# Patient Record
Sex: MALE | Race: WHITE | NOT HISPANIC OR LATINO | Employment: OTHER | URBAN - METROPOLITAN AREA
[De-identification: names, ages, dates, MRNs, and addresses within clinical notes are randomized per-mention and may not be internally consistent; named-entity substitution may affect disease eponyms.]

---

## 2020-10-01 ENCOUNTER — TELEPHONE (OUTPATIENT)
Dept: CARDIOLOGY | Facility: CLINIC | Age: 80
End: 2020-10-01

## 2020-10-01 NOTE — TELEPHONE ENCOUNTER
----- Message from Dot Cope sent at 10/1/2020  8:37 AM CDT -----  PATIENT NEEDS REFILL ON METROPROLOL ER 25MG  1/2 TAB QD  [facility] [phone]

## 2020-10-23 ENCOUNTER — OFFICE VISIT (OUTPATIENT)
Dept: CARDIOLOGY | Facility: CLINIC | Age: 80
End: 2020-10-23
Payer: MEDICARE

## 2020-10-23 VITALS
BODY MASS INDEX: 19.64 KG/M2 | SYSTOLIC BLOOD PRESSURE: 110 MMHG | WEIGHT: 153 LBS | RESPIRATION RATE: 16 BRPM | DIASTOLIC BLOOD PRESSURE: 60 MMHG | HEART RATE: 78 BPM | HEIGHT: 74 IN | OXYGEN SATURATION: 98 %

## 2020-10-23 DIAGNOSIS — R53.83 FATIGUE, UNSPECIFIED TYPE: ICD-10-CM

## 2020-10-23 DIAGNOSIS — M25.512 LEFT SHOULDER PAIN, UNSPECIFIED CHRONICITY: ICD-10-CM

## 2020-10-23 DIAGNOSIS — I95.1 ORTHOSTATIC HYPOTENSION: ICD-10-CM

## 2020-10-23 DIAGNOSIS — Z95.0 PRESENCE OF PERMANENT CARDIAC PACEMAKER: ICD-10-CM

## 2020-10-23 DIAGNOSIS — Z98.890 HISTORY OF MITRAL VALVE REPAIR: Primary | ICD-10-CM

## 2020-10-23 DIAGNOSIS — Z95.1 HX OF CABG: ICD-10-CM

## 2020-10-23 DIAGNOSIS — I51.9 ASYMPTOMATIC LV DYSFUNCTION: ICD-10-CM

## 2020-10-23 DIAGNOSIS — I25.10 CAD IN NATIVE ARTERY: ICD-10-CM

## 2020-10-23 PROCEDURE — 99214 OFFICE O/P EST MOD 30 MIN: CPT | Mod: S$GLB,,, | Performed by: INTERNAL MEDICINE

## 2020-10-23 PROCEDURE — 99214 PR OFFICE/OUTPT VISIT, EST, LEVL IV, 30-39 MIN: ICD-10-PCS | Mod: S$GLB,,, | Performed by: INTERNAL MEDICINE

## 2020-10-23 RX ORDER — METOPROLOL SUCCINATE 25 MG/1
25 TABLET, EXTENDED RELEASE ORAL DAILY
Status: ON HOLD | COMMUNITY
End: 2021-10-07

## 2020-10-23 RX ORDER — CLOPIDOGREL BISULFATE 75 MG/1
75 TABLET ORAL DAILY
COMMUNITY
End: 2020-11-17

## 2020-10-23 RX ORDER — CLONAZEPAM 2 MG/1
2 TABLET ORAL NIGHTLY
COMMUNITY
Start: 2020-10-14

## 2020-10-23 NOTE — PROGRESS NOTES
Subjective:     Patient ID:  Matty Barrera is a 80 y.o. male who presents with:    Chief Complaint:   Chief Complaint   Patient presents with    Fatigue       HPI:  Mr. Matty Barrera is here for follow-up.  Patient is doing well he complains of having his left shoulder hurting and is painful on rotation.  And he was scheduled to see an orthopedic doctor sometime ago but unfortunately his primary physician retired and he had not seen any orthopedic doctor since that.  Patient wants to hold off till the hunting season is over.  Patient's breathing is good denies any shortness of breath or difficulty in breathing.  He occasionally has aches and pains around across the chest wall spontaneously relieved no exertional angina.  Denies any dizziness or lightheadedness except when he bends over.  Denies any palpitations.  Denies any loss of consciousness per denies any falls or head injury  and denies any bleeding or blood in the stool or urine.      Review of patient's allergies indicates:   Allergen Reactions    Iodine     Iohexol     Statins-hmg-coa reductase inhibitors        History reviewed. No pertinent past medical history.  History reviewed. No pertinent surgical history.  Social History     Tobacco Use    Smoking status: Former Smoker    Smokeless tobacco: Never Used   Substance Use Topics    Alcohol use: Never     Frequency: Never    Drug use: Never          REVIEW OF SYSTEMS  CONSTITUTIONAL: Negative for chills,and fever.  Complains of fatigue.  EYES: No double vision, No blurred vision  NEURO: No headaches, No dizziness  RESPIRATORY: Negative for cough, shortness of breath and wheezing.    CARDIOVASCULAR: Negative for chest pain. Negative for palpitations and leg swelling.   GI: Negative for abdominal pain, No melena, diarrhea, nausea and vomiting.   : Negative for dysuria and frequency, Negative for hematuria  SKIN: Negative for bruising, Negative for edema or discoloration noted.   ENDOCRINE: Negative for  polyphagia, Negative for heat intolerance, Negative for cold intolerance  PSYCHIATRIC: Negative for depression, Negative for anxiety, Negative for memory loss  MUSCULOSKELETAL:  Left shoulder pain.  Arthritis.       Objective:        Vitals:    10/23/20 0928   BP: 110/60   Pulse: 78   Resp: 16       No results found for: WBC, HGB, HCT, PLT, CHOL, TRIG, HDL, LDLDIRECT, ALT, AST, NA, K, CL, CREATININE, BUN, CO2, TSH, PSA, INR, GLUF, HGBA1C, MICROALBUR     ECHOCARDIOGRAM RESULTS  No results found for this or any previous visit.      CURRENT/PREVIOUS VISIT EKG  No results found for this or any previous visit.  No procedure found.   No results found for this or any previous visit.      PHYSICAL EXAM  GENERAL: well built, well nourished, well-developed in no apparent distress alert and oriented.   HEENT: Normocephalic. Pupils normal and conjunctivae normal.  Mucous membranes normal, no cyanosis or icterus, trachea central,no pallor or icterus is noted..   NECK: No JVD. No bruit..   THYROID: Thyroid not enlarged. No nodules present..   CARDIAC: Regular rate and rhythm. S1 is normal.S2 is normal.No gallops, clicks or murmurs noted at this time.  CHEST ANATOMY: normal.   LUNGS: Clear to auscultation. No wheezing or rhonchi..   ABDOMEN: Soft no masses or organomegaly.  No abdomen pulsations or bruits.  Normal bowel sounds. No pulsations and no masses felt, No guarding or rebound.   URINARY: No yarbrough catheter   EXTREMITIES: No cyanosis, clubbing or edema noted at this time., no calf tenderness bilaterally.  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  PERIPHERAL VASCULAR SYSTEM: Good palpable distal pulses.   CENTRAL NERVOUS SYSTEM: No focal motor or sensory deficits noted.   SKIN: Skin without lesions, moist, well perfused.   MUSCLE STRENGTH & TONE: No noteable weakness, atrophy or abnormal movement.       Medication List with Changes/Refills   Current Medications    CLONAZEPAM (KLONOPIN) 2 MG TAB    Take 2 mg by mouth nightly.    CLOPIDOGREL  (PLAVIX) 75 MG TABLET    Take 75 mg by mouth once daily.    METOPROLOL SUCCINATE (TOPROL-XL) 25 MG 24 HR TABLET    Take 25 mg by mouth once daily.             Assessment:   1.  Severe muscle ache along the upper portion of the trapezius  2.  Upper shoulder and arm pain.  3.  Status post permanent pacemaker implantation  4.  Atherosclerotic coronary artery disease  5.  Status post mitral valve repair  6.  Status post coronary artery bypass grafting  7.  Abnormal neurocardiogenic Axis  8.  LV dysfunction  9.  Intermittent dizziness on bending over  10.  Fatigue and tiredness      Plan:   1.  Significant shoulder pain she painful rotation of the shoulder.  Will schedule a consult to see an orthopedic doctor  2.  Occasional dizziness and lightheadedness on bending over otherwise patient has been doing well and his blood pressure has been stable it is 110/60.  3.  His pacemaker is functioning well he is currently atrial paced rhythm.  And he still has 1.5 years remaining before the battery needs to be changed.  6.  His EKG has not changed from the previous EKG  7.  His blood pressure is very well controlled.Its 110/60 however when he stands up his blood pressure drops at times and he feels fatigued and tired.  Patient to continue to wear compression stockings and drink Gatorade.  And keep himself well hydrated.  8.  Continue current management and I will see him back in the office in 4-6 months.  9.  Will schedule him for a 2D echocardiogram in 3 months time and I will see him back in the office in 4-5 months time.    Problem List Items Addressed This Visit     None      Visit Diagnoses     History of mitral valve repair    -  Primary    Relevant Orders    Echo Color Flow Doppler? Yes    Left shoulder pain, unspecified chronicity        Presence of permanent cardiac pacemaker        Relevant Orders    Echo Color Flow Doppler? Yes    CAD in native artery        Relevant Orders    Echo Color Flow Doppler? Yes     Orthostatic hypotension        Hx of CABG        Relevant Orders    Echo Color Flow Doppler? Yes    Asymptomatic LV dysfunction        Relevant Orders    Echo Color Flow Doppler? Yes          Follow up in about 5 months (around 3/23/2021).

## 2020-12-08 DIAGNOSIS — I49.5 SICK SINUS SYNDROME: Primary | ICD-10-CM

## 2021-01-14 ENCOUNTER — HOSPITAL ENCOUNTER (OUTPATIENT)
Dept: CARDIOLOGY | Facility: CLINIC | Age: 81
Discharge: HOME | End: 2021-01-14
Attending: INTERNAL MEDICINE
Payer: MEDICARE

## 2021-01-14 DIAGNOSIS — I49.5 SICK SINUS SYNDROME: ICD-10-CM

## 2021-05-13 ENCOUNTER — HOSPITAL ENCOUNTER (OUTPATIENT)
Dept: CARDIOLOGY | Facility: CLINIC | Age: 81
Discharge: HOME | End: 2021-05-13
Payer: MEDICARE

## 2021-05-14 ENCOUNTER — OFFICE VISIT (OUTPATIENT)
Dept: CARDIOLOGY | Facility: CLINIC | Age: 81
End: 2021-05-14
Payer: MEDICARE

## 2021-05-14 VITALS
HEIGHT: 74 IN | RESPIRATION RATE: 16 BRPM | OXYGEN SATURATION: 98 % | BODY MASS INDEX: 20.53 KG/M2 | SYSTOLIC BLOOD PRESSURE: 126 MMHG | DIASTOLIC BLOOD PRESSURE: 82 MMHG | HEART RATE: 76 BPM | WEIGHT: 160 LBS

## 2021-05-14 DIAGNOSIS — M25.512 LEFT SHOULDER PAIN, UNSPECIFIED CHRONICITY: ICD-10-CM

## 2021-05-14 DIAGNOSIS — R55 NEUROCARDIOGENIC SYNCOPE: ICD-10-CM

## 2021-05-14 DIAGNOSIS — Z95.1 HX OF CABG: ICD-10-CM

## 2021-05-14 DIAGNOSIS — Z95.0 PRESENCE OF PERMANENT CARDIAC PACEMAKER: ICD-10-CM

## 2021-05-14 DIAGNOSIS — I25.10 CAD IN NATIVE ARTERY: ICD-10-CM

## 2021-05-14 DIAGNOSIS — I95.1 ORTHOSTATIC HYPOTENSION: ICD-10-CM

## 2021-05-14 DIAGNOSIS — K25.7 CHRONIC GASTRIC ULCER, UNSPECIFIED WHETHER GASTRIC ULCER HEMORRHAGE OR PERFORATION PRESENT: ICD-10-CM

## 2021-05-14 DIAGNOSIS — I51.9 ASYMPTOMATIC LV DYSFUNCTION: ICD-10-CM

## 2021-05-14 DIAGNOSIS — R94.31 NONSPECIFIC ABNORMAL ELECTROCARDIOGRAM (ECG): Primary | ICD-10-CM

## 2021-05-14 DIAGNOSIS — I49.5 SICK SINUS SYNDROME: ICD-10-CM

## 2021-05-14 DIAGNOSIS — Z98.890 HISTORY OF MITRAL VALVE REPAIR: ICD-10-CM

## 2021-05-14 DIAGNOSIS — C64.2 RENAL CELL CARCINOMA OF LEFT KIDNEY: ICD-10-CM

## 2021-05-14 PROCEDURE — 99214 OFFICE O/P EST MOD 30 MIN: CPT | Mod: S$GLB,,, | Performed by: INTERNAL MEDICINE

## 2021-05-14 PROCEDURE — 99214 PR OFFICE/OUTPT VISIT, EST, LEVL IV, 30-39 MIN: ICD-10-PCS | Mod: S$GLB,,, | Performed by: INTERNAL MEDICINE

## 2021-05-14 PROCEDURE — 93000 ELECTROCARDIOGRAM COMPLETE: CPT | Mod: S$GLB,,, | Performed by: INTERNAL MEDICINE

## 2021-05-14 PROCEDURE — 93000 EKG 12-LEAD: ICD-10-PCS | Mod: S$GLB,,, | Performed by: INTERNAL MEDICINE

## 2021-05-14 RX ORDER — CYANOCOBALAMIN 1000 UG/ML
INJECTION, SOLUTION INTRAMUSCULAR; SUBCUTANEOUS
COMMUNITY
Start: 2021-03-24 | End: 2021-10-11

## 2021-05-14 RX ORDER — MECLIZINE HYDROCHLORIDE 25 MG/1
TABLET ORAL
COMMUNITY
Start: 2021-03-06 | End: 2021-10-05 | Stop reason: CLARIF

## 2021-05-14 RX ORDER — PREDNISONE 10 MG/1
TABLET ORAL
COMMUNITY
Start: 2021-03-08 | End: 2021-10-05 | Stop reason: CLARIF

## 2021-05-17 ENCOUNTER — TELEPHONE (OUTPATIENT)
Dept: CARDIOLOGY | Facility: CLINIC | Age: 81
End: 2021-05-17

## 2021-07-02 ENCOUNTER — TELEPHONE (OUTPATIENT)
Dept: CARDIOLOGY | Facility: CLINIC | Age: 81
End: 2021-07-02

## 2021-07-08 ENCOUNTER — HOSPITAL ENCOUNTER (OUTPATIENT)
Dept: CARDIOLOGY | Facility: CLINIC | Age: 81
Discharge: HOME | End: 2021-07-08
Payer: MEDICARE

## 2021-08-19 ENCOUNTER — HOSPITAL ENCOUNTER (OUTPATIENT)
Dept: CARDIOLOGY | Facility: CLINIC | Age: 81
Discharge: HOME | End: 2021-08-19
Payer: MEDICARE

## 2021-09-09 ENCOUNTER — HOSPITAL ENCOUNTER (OUTPATIENT)
Dept: CARDIOLOGY | Facility: CLINIC | Age: 81
Discharge: HOME | End: 2021-09-09
Payer: MEDICARE

## 2021-09-17 ENCOUNTER — TELEPHONE (OUTPATIENT)
Dept: CARDIOLOGY | Facility: CLINIC | Age: 81
End: 2021-09-17

## 2021-09-17 ENCOUNTER — OFFICE VISIT (OUTPATIENT)
Dept: CARDIOLOGY | Facility: CLINIC | Age: 81
End: 2021-09-17
Payer: MEDICARE

## 2021-09-17 VITALS
RESPIRATION RATE: 16 BRPM | WEIGHT: 160 LBS | SYSTOLIC BLOOD PRESSURE: 128 MMHG | OXYGEN SATURATION: 98 % | DIASTOLIC BLOOD PRESSURE: 64 MMHG | HEIGHT: 74 IN | BODY MASS INDEX: 20.53 KG/M2

## 2021-09-17 DIAGNOSIS — I25.10 CAD IN NATIVE ARTERY: Primary | ICD-10-CM

## 2021-09-17 DIAGNOSIS — I49.5 SICK SINUS SYNDROME: ICD-10-CM

## 2021-09-17 DIAGNOSIS — Z95.0 PRESENCE OF PERMANENT CARDIAC PACEMAKER: ICD-10-CM

## 2021-09-17 DIAGNOSIS — Z95.1 HX OF CABG: ICD-10-CM

## 2021-09-17 DIAGNOSIS — C64.2 RENAL CELL CARCINOMA OF LEFT KIDNEY: ICD-10-CM

## 2021-09-17 DIAGNOSIS — Z98.890 HISTORY OF MITRAL VALVE REPAIR: ICD-10-CM

## 2021-09-17 DIAGNOSIS — M25.512 LEFT SHOULDER PAIN, UNSPECIFIED CHRONICITY: ICD-10-CM

## 2021-09-17 DIAGNOSIS — R94.31 NONSPECIFIC ABNORMAL ELECTROCARDIOGRAM (ECG): ICD-10-CM

## 2021-09-17 DIAGNOSIS — I95.1 ORTHOSTATIC HYPOTENSION: ICD-10-CM

## 2021-09-17 DIAGNOSIS — R55 NEUROCARDIOGENIC SYNCOPE: ICD-10-CM

## 2021-09-17 PROCEDURE — 93000 ELECTROCARDIOGRAM COMPLETE: CPT | Mod: S$GLB,,, | Performed by: INTERNAL MEDICINE

## 2021-09-17 PROCEDURE — 99214 PR OFFICE/OUTPT VISIT, EST, LEVL IV, 30-39 MIN: ICD-10-PCS | Mod: S$GLB,,, | Performed by: INTERNAL MEDICINE

## 2021-09-17 PROCEDURE — 93000 EKG 12-LEAD: ICD-10-PCS | Mod: S$GLB,,, | Performed by: INTERNAL MEDICINE

## 2021-09-17 PROCEDURE — 99214 OFFICE O/P EST MOD 30 MIN: CPT | Mod: S$GLB,,, | Performed by: INTERNAL MEDICINE

## 2021-09-17 RX ORDER — CLOPIDOGREL BISULFATE 75 MG/1
37.5 TABLET ORAL DAILY
Qty: 45 TABLET | Refills: 3 | Status: SHIPPED | OUTPATIENT
Start: 2021-09-17 | End: 2022-08-22 | Stop reason: SDUPTHER

## 2021-09-17 RX ORDER — METOPROLOL SUCCINATE 25 MG/1
12.5 TABLET, EXTENDED RELEASE ORAL DAILY
Qty: 45 TABLET | Refills: 3 | Status: SHIPPED | OUTPATIENT
Start: 2021-09-17 | End: 2022-10-17 | Stop reason: SDUPTHER

## 2021-09-21 ENCOUNTER — TELEPHONE (OUTPATIENT)
Dept: CARDIOLOGY | Facility: CLINIC | Age: 81
End: 2021-09-21
Payer: MEDICARE

## 2021-09-21 NOTE — TELEPHONE ENCOUNTER
----- Message from Mandie Murillo sent at 9/21/2021 11:37 AM CDT -----  Regarding: call back pace maker appt  Matty Hudson calling regarding Appointment Access  (message) for # patient is waiting on call back about pacemaker placement give a call back to see when patient will be schedule  [phone]

## 2021-09-22 DIAGNOSIS — R94.31 ABNORMAL ELECTROCARDIOGRAM (ECG) (EKG): ICD-10-CM

## 2021-09-22 DIAGNOSIS — R79.1 ABNORMAL COAGULATION PROFILE: ICD-10-CM

## 2021-09-22 DIAGNOSIS — Z01.818 PREOP TESTING: ICD-10-CM

## 2021-09-22 DIAGNOSIS — Z45.010 PACEMAKER AT END OF BATTERY LIFE: Primary | ICD-10-CM

## 2021-09-22 RX ORDER — MUPIROCIN 20 MG/G
1 OINTMENT TOPICAL
Status: CANCELLED | OUTPATIENT
Start: 2021-09-22

## 2021-09-22 RX ORDER — SODIUM CHLORIDE 9 MG/ML
INJECTION, SOLUTION INTRAVENOUS CONTINUOUS
Status: CANCELLED | OUTPATIENT
Start: 2021-09-22

## 2021-09-23 ENCOUNTER — TELEPHONE (OUTPATIENT)
Dept: CARDIOLOGY | Facility: CLINIC | Age: 81
End: 2021-09-23

## 2021-09-24 ENCOUNTER — TELEPHONE (OUTPATIENT)
Dept: CARDIOLOGY | Facility: CLINIC | Age: 81
End: 2021-09-24

## 2021-09-27 ENCOUNTER — TELEPHONE (OUTPATIENT)
Dept: CARDIOLOGY | Facility: CLINIC | Age: 81
End: 2021-09-27

## 2021-10-05 ENCOUNTER — HOSPITAL ENCOUNTER (OUTPATIENT)
Dept: PREADMISSION TESTING | Facility: HOSPITAL | Age: 81
Discharge: HOME | End: 2021-10-05
Attending: INTERNAL MEDICINE
Payer: MEDICARE

## 2021-10-05 VITALS — WEIGHT: 160 LBS | BODY MASS INDEX: 20.53 KG/M2 | HEIGHT: 74 IN

## 2021-10-05 DIAGNOSIS — R79.1 ABNORMAL COAGULATION PROFILE: ICD-10-CM

## 2021-10-05 DIAGNOSIS — Z45.010 PACEMAKER AT END OF BATTERY LIFE: ICD-10-CM

## 2021-10-05 DIAGNOSIS — R94.31 ABNORMAL ELECTROCARDIOGRAM (ECG) (EKG): ICD-10-CM

## 2021-10-05 DIAGNOSIS — Z01.818 PREOP TESTING: ICD-10-CM

## 2021-10-05 LAB
ANION GAP SERPL CALC-SCNC: 8 MMOL/L (ref 8–16)
APTT PPP: 30 SEC (ref 25.6–35.8)
BASOPHILS # BLD AUTO: 0.05 K/UL (ref 0–0.2)
BASOPHILS NFR BLD: 0.9 % (ref 0–1.9)
BILIRUB UR QL STRIP: NEGATIVE
BUN SERPL-MCNC: 11 MG/DL (ref 8–23)
CALCIUM SERPL-MCNC: 9.2 MG/DL (ref 8.7–10.5)
CHLORIDE SERPL-SCNC: 107 MMOL/L (ref 95–110)
CLARITY UR: CLEAR
CO2 SERPL-SCNC: 29 MMOL/L (ref 23–29)
COLOR UR: COLORLESS
CREAT SERPL-MCNC: 0.9 MG/DL (ref 0.5–1.4)
DIFFERENTIAL METHOD: ABNORMAL
EOSINOPHIL # BLD AUTO: 0.1 K/UL (ref 0–0.5)
EOSINOPHIL NFR BLD: 2.3 % (ref 0–8)
ERYTHROCYTE [DISTWIDTH] IN BLOOD BY AUTOMATED COUNT: 12.6 % (ref 11.5–14.5)
EST. GFR  (AFRICAN AMERICAN): >60 ML/MIN/1.73 M^2
EST. GFR  (NON AFRICAN AMERICAN): >60 ML/MIN/1.73 M^2
GLUCOSE SERPL-MCNC: 106 MG/DL (ref 70–110)
GLUCOSE UR QL STRIP: NEGATIVE
HCT VFR BLD AUTO: 44.6 % (ref 40–54)
HGB BLD-MCNC: 14.8 G/DL (ref 14–18)
HGB UR QL STRIP: NEGATIVE
IMM GRANULOCYTES # BLD AUTO: 0.01 K/UL (ref 0–0.04)
IMM GRANULOCYTES NFR BLD AUTO: 0.2 % (ref 0–0.5)
INR PPP: 1.1
KETONES UR QL STRIP: NEGATIVE
LEUKOCYTE ESTERASE UR QL STRIP: NEGATIVE
LYMPHOCYTES # BLD AUTO: 2.5 K/UL (ref 1–4.8)
LYMPHOCYTES NFR BLD: 45.5 % (ref 18–48)
MCH RBC QN AUTO: 32.3 PG (ref 27–31)
MCHC RBC AUTO-ENTMCNC: 33.2 G/DL (ref 32–36)
MCV RBC AUTO: 97 FL (ref 82–98)
MONOCYTES # BLD AUTO: 0.4 K/UL (ref 0.3–1)
MONOCYTES NFR BLD: 7.2 % (ref 4–15)
MRSA SCREEN BY PCR: NEGATIVE
NEUTROPHILS # BLD AUTO: 2.5 K/UL (ref 1.8–7.7)
NEUTROPHILS NFR BLD: 43.9 % (ref 38–73)
NITRITE UR QL STRIP: NEGATIVE
NRBC BLD-RTO: 0 /100 WBC
PH UR STRIP: 6 [PH] (ref 5–8)
PLATELET # BLD AUTO: 221 K/UL (ref 150–450)
PMV BLD AUTO: 9.2 FL (ref 9.2–12.9)
POTASSIUM SERPL-SCNC: 4.4 MMOL/L (ref 3.5–5.1)
PROT UR QL STRIP: NEGATIVE
PROTHROMBIN TIME: 13 SEC (ref 11.8–14.3)
RBC # BLD AUTO: 4.58 M/UL (ref 4.6–6.2)
SODIUM SERPL-SCNC: 144 MMOL/L (ref 136–145)
SP GR UR STRIP: 1 (ref 1–1.03)
URN SPEC COLLECT METH UR: ABNORMAL
UROBILINOGEN UR STRIP-ACNC: NEGATIVE EU/DL
WBC # BLD AUTO: 5.58 K/UL (ref 3.9–12.7)

## 2021-10-05 PROCEDURE — U0005 INFEC AGEN DETEC AMPLI PROBE: HCPCS | Performed by: INTERNAL MEDICINE

## 2021-10-05 PROCEDURE — U0003 INFECTIOUS AGENT DETECTION BY NUCLEIC ACID (DNA OR RNA); SEVERE ACUTE RESPIRATORY SYNDROME CORONAVIRUS 2 (SARS-COV-2) (CORONAVIRUS DISEASE [COVID-19]), AMPLIFIED PROBE TECHNIQUE, MAKING USE OF HIGH THROUGHPUT TECHNOLOGIES AS DESCRIBED BY CMS-2020-01-R: HCPCS | Performed by: INTERNAL MEDICINE

## 2021-10-05 PROCEDURE — 81003 URINALYSIS AUTO W/O SCOPE: CPT | Performed by: NURSE PRACTITIONER

## 2021-10-05 PROCEDURE — 93010 ELECTROCARDIOGRAM REPORT: CPT | Mod: ,,, | Performed by: INTERNAL MEDICINE

## 2021-10-05 PROCEDURE — 85730 THROMBOPLASTIN TIME PARTIAL: CPT | Performed by: NURSE PRACTITIONER

## 2021-10-05 PROCEDURE — 85610 PROTHROMBIN TIME: CPT | Performed by: NURSE PRACTITIONER

## 2021-10-05 PROCEDURE — 85025 COMPLETE CBC W/AUTO DIFF WBC: CPT | Performed by: NURSE PRACTITIONER

## 2021-10-05 PROCEDURE — 80048 BASIC METABOLIC PNL TOTAL CA: CPT | Performed by: NURSE PRACTITIONER

## 2021-10-05 PROCEDURE — 36415 COLL VENOUS BLD VENIPUNCTURE: CPT | Performed by: NURSE PRACTITIONER

## 2021-10-05 PROCEDURE — 93010 EKG 12-LEAD: ICD-10-PCS | Mod: ,,, | Performed by: INTERNAL MEDICINE

## 2021-10-05 PROCEDURE — 87641 MR-STAPH DNA AMP PROBE: CPT | Performed by: INTERNAL MEDICINE

## 2021-10-05 PROCEDURE — 93005 ELECTROCARDIOGRAM TRACING: CPT | Performed by: INTERNAL MEDICINE

## 2021-10-06 LAB
SARS-COV-2 RNA RESP QL NAA+PROBE: NOT DETECTED
SARS-COV-2- CYCLE NUMBER: NORMAL

## 2021-10-07 ENCOUNTER — HOSPITAL ENCOUNTER (OUTPATIENT)
Facility: HOSPITAL | Age: 81
Discharge: HOME | End: 2021-10-07
Attending: INTERNAL MEDICINE | Admitting: INTERNAL MEDICINE
Payer: MEDICARE

## 2021-10-07 DIAGNOSIS — R94.31 ABNORMAL ELECTROCARDIOGRAM (ECG) (EKG): ICD-10-CM

## 2021-10-07 DIAGNOSIS — Z45.010 PACEMAKER AT END OF BATTERY LIFE: ICD-10-CM

## 2021-10-07 DIAGNOSIS — Z01.818 PREOP TESTING: ICD-10-CM

## 2021-10-07 DIAGNOSIS — I49.9 ARRHYTHMIA: ICD-10-CM

## 2021-10-07 PROCEDURE — 93010 EKG 12-LEAD: ICD-10-PCS | Mod: ,,, | Performed by: INTERNAL MEDICINE

## 2021-10-07 PROCEDURE — 33228 REMV&REPLC PM GEN DUAL LEAD: CPT | Performed by: INTERNAL MEDICINE

## 2021-10-07 PROCEDURE — 25000003 PHARM REV CODE 250: Performed by: INTERNAL MEDICINE

## 2021-10-07 PROCEDURE — 99152 MOD SED SAME PHYS/QHP 5/>YRS: CPT | Mod: ,,, | Performed by: INTERNAL MEDICINE

## 2021-10-07 PROCEDURE — 25000003 PHARM REV CODE 250: Performed by: NURSE PRACTITIONER

## 2021-10-07 PROCEDURE — 63600175 PHARM REV CODE 636 W HCPCS: Performed by: NURSE PRACTITIONER

## 2021-10-07 PROCEDURE — 33228 REMV&REPLC PM GEN DUAL LEAD: CPT | Mod: ,,, | Performed by: INTERNAL MEDICINE

## 2021-10-07 PROCEDURE — 27201423 OPTIME MED/SURG SUP & DEVICES STERILE SUPPLY: Performed by: INTERNAL MEDICINE

## 2021-10-07 PROCEDURE — 93010 ELECTROCARDIOGRAM REPORT: CPT | Mod: ,,, | Performed by: INTERNAL MEDICINE

## 2021-10-07 PROCEDURE — 99152 PR MOD CONSCIOUS SEDATION, SAME PHYS, 5+ YRS, FIRST 15 MIN: ICD-10-PCS | Mod: ,,, | Performed by: INTERNAL MEDICINE

## 2021-10-07 PROCEDURE — 96366 THER/PROPH/DIAG IV INF ADDON: CPT | Performed by: INTERNAL MEDICINE

## 2021-10-07 PROCEDURE — 99153 MOD SED SAME PHYS/QHP EA: CPT | Performed by: INTERNAL MEDICINE

## 2021-10-07 PROCEDURE — 99152 MOD SED SAME PHYS/QHP 5/>YRS: CPT | Performed by: INTERNAL MEDICINE

## 2021-10-07 PROCEDURE — 33228 PR REMV&REPLC PM GEN DUAL LEAD: ICD-10-PCS | Mod: ,,, | Performed by: INTERNAL MEDICINE

## 2021-10-07 PROCEDURE — 96365 THER/PROPH/DIAG IV INF INIT: CPT | Performed by: INTERNAL MEDICINE

## 2021-10-07 PROCEDURE — C1786 PMKR, SINGLE, RATE-RESP: HCPCS | Performed by: INTERNAL MEDICINE

## 2021-10-07 PROCEDURE — 96374 THER/PROPH/DIAG INJ IV PUSH: CPT | Mod: 59 | Performed by: INTERNAL MEDICINE

## 2021-10-07 PROCEDURE — 63600175 PHARM REV CODE 636 W HCPCS: Performed by: INTERNAL MEDICINE

## 2021-10-07 PROCEDURE — 93005 ELECTROCARDIOGRAM TRACING: CPT | Performed by: INTERNAL MEDICINE

## 2021-10-07 DEVICE — PACEMAKER
Type: IMPLANTABLE DEVICE | Site: CHEST | Status: FUNCTIONAL
Brand: ESSENTIO™ MRI DR

## 2021-10-07 RX ORDER — FENTANYL CITRATE 50 UG/ML
INJECTION, SOLUTION INTRAMUSCULAR; INTRAVENOUS
Status: DISCONTINUED | OUTPATIENT
Start: 2021-10-07 | End: 2021-10-07 | Stop reason: HOSPADM

## 2021-10-07 RX ORDER — ACETAMINOPHEN 325 MG/1
650 TABLET ORAL EVERY 6 HOURS PRN
Status: DISCONTINUED | OUTPATIENT
Start: 2021-10-07 | End: 2021-10-07 | Stop reason: HOSPADM

## 2021-10-07 RX ORDER — LIDOCAINE HYDROCHLORIDE 10 MG/ML
INJECTION, SOLUTION EPIDURAL; INFILTRATION; INTRACAUDAL; PERINEURAL
Status: DISCONTINUED | OUTPATIENT
Start: 2021-10-07 | End: 2021-10-07 | Stop reason: HOSPADM

## 2021-10-07 RX ORDER — MIDAZOLAM HYDROCHLORIDE 1 MG/ML
INJECTION INTRAMUSCULAR; INTRAVENOUS
Status: DISCONTINUED | OUTPATIENT
Start: 2021-10-07 | End: 2021-10-07 | Stop reason: HOSPADM

## 2021-10-07 RX ORDER — VANCOMYCIN HCL IN 5 % DEXTROSE 1G/250ML
1000 PLASTIC BAG, INJECTION (ML) INTRAVENOUS
Status: DISCONTINUED | OUTPATIENT
Start: 2021-10-07 | End: 2021-10-07

## 2021-10-07 RX ORDER — CEFAZOLIN SODIUM 2 G/50ML
2 SOLUTION INTRAVENOUS
Status: DISCONTINUED | OUTPATIENT
Start: 2021-10-07 | End: 2021-10-07 | Stop reason: HOSPADM

## 2021-10-07 RX ORDER — SODIUM CHLORIDE 9 MG/ML
INJECTION, SOLUTION INTRAVENOUS CONTINUOUS
Status: DISCONTINUED | OUTPATIENT
Start: 2021-10-07 | End: 2021-10-07 | Stop reason: HOSPADM

## 2021-10-07 RX ORDER — MUPIROCIN 20 MG/G
1 OINTMENT TOPICAL
Status: DISCONTINUED | OUTPATIENT
Start: 2021-10-07 | End: 2021-10-07 | Stop reason: HOSPADM

## 2021-10-07 RX ORDER — CEPHALEXIN 500 MG/1
500 CAPSULE ORAL EVERY 8 HOURS
Qty: 21 CAPSULE | Refills: 0 | Status: SHIPPED | OUTPATIENT
Start: 2021-10-07 | End: 2022-09-30

## 2021-10-07 RX ORDER — MUPIROCIN 20 MG/G
OINTMENT TOPICAL
Status: DISCONTINUED | OUTPATIENT
Start: 2021-10-07 | End: 2021-10-07 | Stop reason: HOSPADM

## 2021-10-07 RX ORDER — CEPHALEXIN 250 MG/1
500 CAPSULE ORAL EVERY 8 HOURS
Status: DISCONTINUED | OUTPATIENT
Start: 2021-10-07 | End: 2021-10-07 | Stop reason: HOSPADM

## 2021-10-07 RX ADMIN — VANCOMYCIN HYDROCHLORIDE 1500 MG: 1.5 INJECTION, POWDER, LYOPHILIZED, FOR SOLUTION INTRAVENOUS at 07:10

## 2021-10-07 RX ADMIN — ACETAMINOPHEN 650 MG: 325 TABLET, FILM COATED ORAL at 12:10

## 2021-10-07 RX ADMIN — SODIUM CHLORIDE: 0.9 INJECTION, SOLUTION INTRAVENOUS at 07:10

## 2021-10-08 ENCOUNTER — TELEPHONE (OUTPATIENT)
Dept: CARDIOLOGY | Facility: CLINIC | Age: 81
End: 2021-10-08

## 2021-10-08 VITALS
HEART RATE: 70 BPM | OXYGEN SATURATION: 97 % | SYSTOLIC BLOOD PRESSURE: 110 MMHG | DIASTOLIC BLOOD PRESSURE: 67 MMHG | RESPIRATION RATE: 16 BRPM

## 2021-10-12 ENCOUNTER — OFFICE VISIT (OUTPATIENT)
Dept: CARDIOLOGY | Facility: CLINIC | Age: 81
End: 2021-10-12
Payer: MEDICARE

## 2021-10-12 VITALS
RESPIRATION RATE: 16 BRPM | WEIGHT: 157 LBS | BODY MASS INDEX: 20.15 KG/M2 | DIASTOLIC BLOOD PRESSURE: 80 MMHG | HEART RATE: 76 BPM | HEIGHT: 74 IN | OXYGEN SATURATION: 98 % | SYSTOLIC BLOOD PRESSURE: 126 MMHG

## 2021-10-12 DIAGNOSIS — Z95.1 HX OF CABG: ICD-10-CM

## 2021-10-12 DIAGNOSIS — I51.9 ASYMPTOMATIC LV DYSFUNCTION: ICD-10-CM

## 2021-10-12 DIAGNOSIS — Z45.010 PACEMAKER AT END OF BATTERY LIFE: ICD-10-CM

## 2021-10-12 DIAGNOSIS — I49.5 SICK SINUS SYNDROME: ICD-10-CM

## 2021-10-12 DIAGNOSIS — I25.10 CAD IN NATIVE ARTERY: Primary | ICD-10-CM

## 2021-10-12 DIAGNOSIS — I95.1 ORTHOSTATIC HYPOTENSION: ICD-10-CM

## 2021-10-12 DIAGNOSIS — R55 NEUROCARDIOGENIC SYNCOPE: ICD-10-CM

## 2021-10-12 DIAGNOSIS — R94.31 ABNORMAL ELECTROCARDIOGRAM (ECG) (EKG): ICD-10-CM

## 2021-10-12 DIAGNOSIS — Z95.0 PRESENCE OF PERMANENT CARDIAC PACEMAKER: ICD-10-CM

## 2021-10-12 PROCEDURE — 93000 EKG 12-LEAD: ICD-10-PCS | Mod: S$GLB,,, | Performed by: INTERNAL MEDICINE

## 2021-10-12 PROCEDURE — 93000 ELECTROCARDIOGRAM COMPLETE: CPT | Mod: S$GLB,,, | Performed by: INTERNAL MEDICINE

## 2021-10-12 PROCEDURE — 99024 PR POST-OP FOLLOW-UP VISIT: ICD-10-PCS | Mod: S$GLB,,, | Performed by: INTERNAL MEDICINE

## 2021-10-12 PROCEDURE — 99024 POSTOP FOLLOW-UP VISIT: CPT | Mod: S$GLB,,, | Performed by: INTERNAL MEDICINE

## 2021-10-14 RX ORDER — CYANOCOBALAMIN 1000 UG/ML
1000 INJECTION, SOLUTION INTRAMUSCULAR; SUBCUTANEOUS
COMMUNITY
End: 2021-10-14 | Stop reason: SDUPTHER

## 2021-10-14 RX ORDER — CYANOCOBALAMIN 1000 UG/ML
1000 INJECTION, SOLUTION INTRAMUSCULAR; SUBCUTANEOUS
Qty: 1 ML | Refills: 11 | Status: SHIPPED | OUTPATIENT
Start: 2021-10-14

## 2021-11-29 ENCOUNTER — TELEPHONE (OUTPATIENT)
Dept: CARDIOLOGY | Facility: CLINIC | Age: 81
End: 2021-11-29
Payer: MEDICARE

## 2022-01-13 ENCOUNTER — HOSPITAL ENCOUNTER (OUTPATIENT)
Dept: CARDIOLOGY | Facility: CLINIC | Age: 82
Discharge: HOME | End: 2022-01-13
Payer: MEDICARE

## 2022-02-25 ENCOUNTER — OFFICE VISIT (OUTPATIENT)
Dept: CARDIOLOGY | Facility: CLINIC | Age: 82
End: 2022-02-25
Payer: MEDICARE

## 2022-02-25 VITALS
SYSTOLIC BLOOD PRESSURE: 138 MMHG | HEART RATE: 72 BPM | BODY MASS INDEX: 21.05 KG/M2 | RESPIRATION RATE: 16 BRPM | OXYGEN SATURATION: 98 % | WEIGHT: 164 LBS | DIASTOLIC BLOOD PRESSURE: 72 MMHG | HEIGHT: 74 IN

## 2022-02-25 DIAGNOSIS — Z95.1 HX OF CABG: ICD-10-CM

## 2022-02-25 DIAGNOSIS — M62.838 MUSCLE SPASM: ICD-10-CM

## 2022-02-25 DIAGNOSIS — M62.830 BACK MUSCLE SPASM: ICD-10-CM

## 2022-02-25 DIAGNOSIS — I25.10 CAD IN NATIVE ARTERY: ICD-10-CM

## 2022-02-25 DIAGNOSIS — Z98.890 STATUS POST MITRAL VALVE REPAIR: ICD-10-CM

## 2022-02-25 DIAGNOSIS — R94.31 ABNORMAL ELECTROCARDIOGRAM (ECG) (EKG): ICD-10-CM

## 2022-02-25 DIAGNOSIS — R55 NEUROCARDIOGENIC SYNCOPE: ICD-10-CM

## 2022-02-25 DIAGNOSIS — Z95.0 PRESENCE OF PERMANENT CARDIAC PACEMAKER: ICD-10-CM

## 2022-02-25 DIAGNOSIS — I34.0 SEVERE MITRAL VALVE REGURGITATION: Primary | ICD-10-CM

## 2022-02-25 DIAGNOSIS — R94.31 NONSPECIFIC ABNORMAL ELECTROCARDIOGRAM (ECG) (EKG): ICD-10-CM

## 2022-02-25 DIAGNOSIS — I49.5 SICK SINUS SYNDROME: ICD-10-CM

## 2022-02-25 PROCEDURE — 99215 PR OFFICE/OUTPT VISIT, EST, LEVL V, 40-54 MIN: ICD-10-PCS | Mod: 25,S$GLB,, | Performed by: INTERNAL MEDICINE

## 2022-02-25 PROCEDURE — 93000 EKG 12-LEAD: ICD-10-PCS | Mod: S$GLB,,, | Performed by: INTERNAL MEDICINE

## 2022-02-25 PROCEDURE — 99215 OFFICE O/P EST HI 40 MIN: CPT | Mod: 25,S$GLB,, | Performed by: INTERNAL MEDICINE

## 2022-02-25 PROCEDURE — 93000 ELECTROCARDIOGRAM COMPLETE: CPT | Mod: S$GLB,,, | Performed by: INTERNAL MEDICINE

## 2022-02-25 NOTE — PROGRESS NOTES
Subjective:    Patient ID:  Matty Barrera is a 81 y.o. male       Chief Complaint   Patient presents with    Follow-up       HPI:  Mr Matty Barrera is a 81 y.o. male is here for follow-up.  Patient is doing well his breathing is good but he has been having intermittent dizziness on walking P and has been having pain in the neck and upper shoulders as well as pain in the right side.  And also on the pectoral muscle.  Denies any chest pain or tightness or heaviness.  Denies any loss of consciousness falls or head injury.    Review of patient's allergies indicates:   Allergen Reactions    Iodine     Iohexol     Statins-hmg-coa reductase inhibitors        Past Medical History:   Diagnosis Date    COPD (chronic obstructive pulmonary disease)     Gastric ulcer     Heart block     Heart murmur     History of mitral valve repair     Hx of CABG     Hyperlipidemia     Neurocardiogenic syncope     Pacemaker     [employer]    Pulmonary hypertension     Skin cancer      Past Surgical History:   Procedure Laterality Date    APPENDECTOMY      BACK SURGERY      CARDIAC CATHETERIZATION      CARDIAC VALVE SURGERY      CHOLECYSTECTOMY      CORONARY ARTERY BYPASS GRAFT      Gastric ulcer surgery      INSERT / REPLACE / REMOVE PACEMAKER      KNEE SURGERY      Left renal cell carcinoma removed      NECK SURGERY      REPLACEMENT OF PACEMAKER GENERATOR N/A 10/7/2021    Procedure: REPLACEMENT, PULSE GENERATOR, CARDIAC PACEMAKER;  Surgeon: Wyatt Hennessy MD;  Location: [facility] CATH/EP LAB;  Service: Cardiology;  Laterality: N/A;    SHOULDER SURGERY       Social History     Tobacco Use    Smoking status: Former Smoker    Smokeless tobacco: Never Used   Substance Use Topics    Alcohol use: Never    Drug use: Never     History reviewed. No pertinent family history.     Review of Systems:   Constitution: Negative for diaphoresis and fever.   HEENT: Negative for nosebleeds.    Cardiovascular: Negative for chest  pain       No dyspnea on exertion       No leg swelling        No palpitations  Respiratory: Negative for shortness of breath and wheezing.    Hematologic/Lymphatic: Negative for bleeding problem. Does not bruise/bleed easily.   Skin: Negative for color change and rash.   Musculoskeletal: Negative for falls and myalgias.  Significant pain in the neck and in the left pectoral muscle and in the right low back.   Gastrointestinal: Negative for hematemesis and hematochezia.   Genitourinary: Negative for hematuria.   Neurological: Negative for dizziness and light-headedness.   Psychiatric/Behavioral: Negative for altered mental status and memory loss.          Objective:        Vitals:    02/25/22 1051   BP: 138/72   Pulse: 72   Resp: 16       Lab Results   Component Value Date    WBC 5.58 10/05/2021    HGB 14.8 10/05/2021    HCT 44.6 10/05/2021     10/05/2021     10/05/2021    K 4.4 10/05/2021     10/05/2021    CREATININE 0.9 10/05/2021    BUN 11 10/05/2021    CO2 29 10/05/2021    INR 1.1 10/05/2021        ECHOCARDIOGRAM RESULTS  No results found for this or any previous visit.        CURRENT/PREVIOUS VISIT EKG  Results for orders placed or performed in visit on 10/12/21   IN OFFICE EKG 12-LEAD (to [place])    Collection Time: 10/12/21 12:59 PM    Narrative    Test Reason : I25.10,    Vent. Rate : 073 BPM     Atrial Rate : 073 BPM     P-R Int : 080 ms          QRS Dur : 176 ms      QT Int : 470 ms       P-R-T Axes : 000 -88 086 degrees     QTc Int : 517 ms    AV dual-paced rhythm  Abnormal ECG  When compared with ECG of 07-OCT-2021 10:58,  Electronic ventricular pacemaker has replaced Electronic atrial pacemaker  Confirmed by Wyatt Hennessy MD ([id number]) on 10/30/2021 7:58:27 PM    Referred By:             Confirmed By:Wyatt Hennessy MD     No valid procedures specified.   No results found for this or any previous visit.      Physical Exam:  CONSTITUTIONAL: No fever, no chills  HEENT: Normocephalic,  atraumatic,pupils reactive to light                 NECK:  No JVD no carotid bruit  CVS: S1S2+, RRR, no murmurs,   LUNGS: Clear  ABDOMEN: Soft, NT, BS+  EXTREMITIES: No cyanosis, edema  : No yarbrough catheter  NEURO: AAO X 3  PSY: Normal affect  Significant spasm in the neck and in the trapezius, severe spasm of the muscle of the pectoral muscle as well as in the right flank.      Medication List with Changes/Refills   Current Medications    CEPHALEXIN (KEFLEX) 500 MG CAPSULE    Take 1 capsule (500 mg total) by mouth every 8 (eight) hours.    CLONAZEPAM (KLONOPIN) 2 MG TAB    Take 2 mg by mouth nightly.    CLOPIDOGREL (PLAVIX) 75 MG TABLET    Take 0.5 tablets (37.5 mg total) by mouth once daily.    CYANOCOBALAMIN 1,000 MCG/ML INJECTION    INJECT 1 ML (CC) INTRAMUSCULARLY ONCE EVERY OTHER MONTH    CYANOCOBALAMIN 1,000 MCG/ML INJECTION    Inject 1 mL (1,000 mcg total) into the muscle every 30 days.    METOPROLOL SUCCINATE (TOPROL-XL) 25 MG 24 HR TABLET    Take 0.5 tablets (12.5 mg total) by mouth once daily.             Assessment:       1. Severe mitral valve regurgitation    2. Status post mitral valve repair    3. CAD in native artery    4. Hx of CABG    5. Presence of permanent cardiac pacemaker    6. Sick sinus syndrome    7. Abnormal electrocardiogram (ECG) (EKG)    8. Neurocardiogenic syncope    9. Back muscle spasm    10. Muscle spasm    11. Nonspecific abnormal electrocardiogram (ECG) (EKG)         Plan:   1. Patient's blood pressure is stable at 130 2/72.  And he is currently on metoprolol succinate 12.5 mg p.o. q.day continue the same.  2. Patient is on Plavix 37.5 mg p.o. q.day continue the same no bleeding issues no blood in the stool urine.  3. Patient has significant muscle spasm along the superior nuchal line as well as along the trapezius on either side of the shoulders.  And I relieved the muscle spasm a manual pressure.  4. Patient also has significant muscle spasm of his pectoral muscle which is  what was hurting the strands of pectoral muscles have been spasm significantly and it was relieved manually.  5. Reviewed his EKG independently patient is in atrial paced rhythm at a rate of 72 beats per minute with prolonged AV conduction incomplete right bundle branch block ST T-wave changes in the inferior and inferior lateral leads no significant change from the previous EKGs.  6. Patient has history of abnormal neurocardiogenic axis and needs to keep himself adequately hydrated and wear compression stockings P  7. His pacemaker is functioning well and has currently 7 years battery life.  And normal intervals and normal function.  8. Patient to continue current management and I will see him back in the office in 6 months time.          Problem List Items Addressed This Visit        Cardiac/Vascular    CAD in native artery    Presence of permanent cardiac pacemaker    Sick sinus syndrome    Hx of CABG    Abnormal electrocardiogram (ECG) (EKG)    Status post mitral valve repair    Nonspecific abnormal electrocardiogram (ECG) (EKG)    Relevant Orders    IN OFFICE EKG 12-LEAD (to [place])       Orthopedic    Muscle spasm    Back muscle spasm       Other    Neurocardiogenic syncope    Severe mitral valve regurgitation - Primary          No follow-ups on file.

## 2022-06-09 ENCOUNTER — HOSPITAL ENCOUNTER (OUTPATIENT)
Dept: CARDIOLOGY | Facility: CLINIC | Age: 82
Discharge: HOME | End: 2022-06-09
Payer: MEDICARE

## 2022-08-18 ENCOUNTER — TELEPHONE (OUTPATIENT)
Dept: CARDIOLOGY | Facility: CLINIC | Age: 82
End: 2022-08-18
Payer: MEDICARE

## 2022-08-18 NOTE — TELEPHONE ENCOUNTER
Voicemail is disabled. He needs to reschedule tomorrows appointment due to being on call tomorrow at [facility]

## 2022-08-22 NOTE — TELEPHONE ENCOUNTER
----- Message from Dot Cope sent at 8/22/2022  8:35 AM CDT -----  Needs a refill on Clopidogrel 75mg 1/2 qd.  [facility]   Please call back.    [phone]

## 2022-08-30 ENCOUNTER — TELEPHONE (OUTPATIENT)
Dept: CARDIOLOGY | Facility: CLINIC | Age: 82
End: 2022-08-30
Payer: MEDICARE

## 2022-08-30 NOTE — TELEPHONE ENCOUNTER
----- Message from Avtar Burroughs sent at 8/30/2022  1:33 PM CDT -----  Type: Needs Medical Advice  Who Called: Pt wife Dominga  Symptoms (please be specific):   How long has patient had these symptoms:    Pharmacy name and phone #:    Best Call Back Number: [phone]  Additional Information: Pt requesting a call back for scheduling appt.for pacemaker.

## 2022-08-31 NOTE — TELEPHONE ENCOUNTER
Told her we still don't have a [place] schedule for pacemakers. I scheduled his follow up with Dr Wyatt krause in October.

## 2022-09-02 RX ORDER — CLOPIDOGREL BISULFATE 75 MG/1
37.5 TABLET ORAL DAILY
Qty: 45 TABLET | Refills: 3 | Status: SHIPPED | OUTPATIENT
Start: 2022-09-02 | End: 2023-09-02

## 2022-09-30 ENCOUNTER — OFFICE VISIT (OUTPATIENT)
Dept: CARDIOLOGY | Facility: CLINIC | Age: 82
End: 2022-09-30
Payer: MEDICARE

## 2022-09-30 VITALS
DIASTOLIC BLOOD PRESSURE: 80 MMHG | SYSTOLIC BLOOD PRESSURE: 126 MMHG | BODY MASS INDEX: 20.02 KG/M2 | OXYGEN SATURATION: 98 % | HEIGHT: 74 IN | HEART RATE: 70 BPM | WEIGHT: 156 LBS | RESPIRATION RATE: 16 BRPM

## 2022-09-30 DIAGNOSIS — R94.31 NONSPECIFIC ABNORMAL ELECTROCARDIOGRAM (ECG): ICD-10-CM

## 2022-09-30 DIAGNOSIS — Z95.0 PRESENCE OF PERMANENT CARDIAC PACEMAKER: ICD-10-CM

## 2022-09-30 DIAGNOSIS — R94.31 NONSPECIFIC ABNORMAL ELECTROCARDIOGRAM (ECG) (EKG): ICD-10-CM

## 2022-09-30 DIAGNOSIS — R94.31 ABNORMAL ELECTROCARDIOGRAM (ECG) (EKG): ICD-10-CM

## 2022-09-30 DIAGNOSIS — I95.1 ORTHOSTATIC HYPOTENSION: ICD-10-CM

## 2022-09-30 DIAGNOSIS — I49.5 SICK SINUS SYNDROME: ICD-10-CM

## 2022-09-30 DIAGNOSIS — R55 NEUROCARDIOGENIC SYNCOPE: ICD-10-CM

## 2022-09-30 DIAGNOSIS — Z95.1 HX OF CABG: ICD-10-CM

## 2022-09-30 DIAGNOSIS — I25.10 CAD IN NATIVE ARTERY: Primary | ICD-10-CM

## 2022-09-30 DIAGNOSIS — Z98.890 STATUS POST MITRAL VALVE REPAIR: ICD-10-CM

## 2022-09-30 DIAGNOSIS — I34.0 SEVERE MITRAL VALVE REGURGITATION: ICD-10-CM

## 2022-09-30 DIAGNOSIS — M62.838 MUSCLE SPASM: ICD-10-CM

## 2022-09-30 PROCEDURE — 99214 OFFICE O/P EST MOD 30 MIN: CPT | Mod: S$GLB,,, | Performed by: INTERNAL MEDICINE

## 2022-09-30 PROCEDURE — 93000 ELECTROCARDIOGRAM COMPLETE: CPT | Mod: S$GLB,,, | Performed by: INTERNAL MEDICINE

## 2022-09-30 PROCEDURE — 93000 EKG 12-LEAD: ICD-10-PCS | Mod: S$GLB,,, | Performed by: INTERNAL MEDICINE

## 2022-09-30 PROCEDURE — 99214 PR OFFICE/OUTPT VISIT, EST, LEVL IV, 30-39 MIN: ICD-10-PCS | Mod: S$GLB,,, | Performed by: INTERNAL MEDICINE

## 2022-09-30 NOTE — PROGRESS NOTES
Subjective:    Patient ID:  Matty Barrera is a 82 y.o. male     Chief Complaint   Patient presents with    Abnormal ECG       HPI:  Mr Matty Barrera is a 82 y.o. male is here for follow-up.    Patient has been doing well no specific complaints at the present time is breathing has been good denies any shortness of breath that he does have some dyspnea on exertion.  Denies any chest pain or tightness or heaviness denies any dizziness or lightheadedness or loss of consciousness.  Occasionally he does have the balance issue.    he has been taking his medications regularly.    he has been started on cholesterol medication by his primary physician.        Review of patient's allergies indicates:   Allergen Reactions    Iodine      Other reaction(s): Hives    Shellfish containing products Hives    Iohexol     Statins-hmg-coa reductase inhibitors        Past Medical History:   Diagnosis Date    COPD (chronic obstructive pulmonary disease)     Gastric ulcer     Heart block     Heart murmur     History of mitral valve repair     Hx of CABG     Hyperlipidemia     Neurocardiogenic syncope     Pacemaker     [employer]    Pulmonary hypertension     Skin cancer      Past Surgical History:   Procedure Laterality Date    APPENDECTOMY      BACK SURGERY      CARDIAC CATHETERIZATION      CARDIAC VALVE SURGERY      CHOLECYSTECTOMY      CORONARY ARTERY BYPASS GRAFT      Gastric ulcer surgery      INSERT / REPLACE / REMOVE PACEMAKER      KNEE SURGERY      Left renal cell carcinoma removed      NECK SURGERY      REPLACEMENT OF PACEMAKER GENERATOR N/A 10/7/2021    Procedure: REPLACEMENT, PULSE GENERATOR, CARDIAC PACEMAKER;  Surgeon: Wyatt Hennessy MD;  Location: [facility] CATH/EP LAB;  Service: Cardiology;  Laterality: N/A;    SHOULDER SURGERY       Social History     Tobacco Use    Smoking status: Former    Smokeless tobacco: Never   Substance Use Topics    Alcohol use: Never    Drug use: Never     History reviewed. No pertinent family  history.     Review of Systems:   Constitution: Negative for diaphoresis and fever.   HEENT: Negative for nosebleeds.    Cardiovascular: Negative for chest pain       Intermittent dyspnea on exertion       No leg swelling        No palpitations  Respiratory: Negative for shortness of breath and wheezing.    Hematologic/Lymphatic: Negative for bleeding problem. Does not bruise/bleed easily.   Skin: Negative for color change and rash.   Musculoskeletal: Negative for falls and myalgias.   Gastrointestinal: Negative for hematemesis and hematochezia.   Genitourinary: Negative for hematuria.   Neurological: Negative for dizziness and light-headedness.   Psychiatric/Behavioral: Negative for altered mental status and memory loss.          Objective:        Vitals:    09/30/22 1336   BP: 126/80   Pulse: 70   Resp: 16       Lab Results   Component Value Date    WBC 5.58 10/05/2021    HGB 14.8 10/05/2021    HCT 44.6 10/05/2021     10/05/2021     10/05/2021    K 4.4 10/05/2021     10/05/2021    CREATININE 0.9 10/05/2021    BUN 11 10/05/2021    CO2 29 10/05/2021    INR 1.1 10/05/2021        ECHOCARDIOGRAM RESULTS  No results found for this or any previous visit.        CURRENT/PREVIOUS VISIT EKG  Results for orders placed or performed in visit on 02/25/22   IN OFFICE EKG 12-LEAD (to [place])    Collection Time: 02/25/22 10:50 AM    Narrative    Test Reason : R94.31,    Vent. Rate : 072 BPM     Atrial Rate : 072 BPM     P-R Int : 226 ms          QRS Dur : 100 ms      QT Int : 376 ms       P-R-T Axes : 058 067 -38 degrees     QTc Int : 411 ms    Atrial-paced rhythm with prolonged AV conduction  Incomplete right bundle branch block  ST and T wave abnormality, consider inferior ischemia  Abnormal ECG  When compared with ECG of 12-OCT-2021 12:59,  Electronic atrial pacemaker has replaced Electronic ventricular pacemaker  Confirmed by Wyatt Hennessy MD ([id number]) on 2/27/2022 9:44:11 PM    Referred By:              Confirmed By:Wyatt Hennessy MD     No valid procedures specified.   No results found for this or any previous visit.      Physical Exam:  CONSTITUTIONAL: No fever, no chills  HEENT: Normocephalic, atraumatic,pupils reactive to light                 NECK:  No JVD no carotid bruit  CVS: S1S2+, RRR, systolic murmurs,   LUNGS: Clear  ABDOMEN: Soft, NT, BS+  EXTREMITIES: No cyanosis, edema  : No yarbrough catheter  NEURO: AAO X 3  PSY: Normal affect      Medication List with Changes/Refills   Current Medications    CLONAZEPAM (KLONOPIN) 2 MG TAB    Take 2 mg by mouth nightly.    CLOPIDOGREL (PLAVIX) 75 MG TABLET    Take 0.5 tablets (37.5 mg total) by mouth once daily.    CYANOCOBALAMIN 1,000 MCG/ML INJECTION    INJECT 1 ML (CC) INTRAMUSCULARLY ONCE EVERY OTHER MONTH    CYANOCOBALAMIN 1,000 MCG/ML INJECTION    Inject 1 mL (1,000 mcg total) into the muscle every 30 days.    METOPROLOL SUCCINATE (TOPROL-XL) 25 MG 24 HR TABLET    Take 0.5 tablets (12.5 mg total) by mouth once daily.   Discontinued Medications    CEPHALEXIN (KEFLEX) 500 MG CAPSULE    Take 1 capsule (500 mg total) by mouth every 8 (eight) hours.             Assessment:       1. CAD in native artery    2. Severe mitral valve regurgitation    3. Status post mitral valve repair    4. Hx of CABG    5. Presence of permanent cardiac pacemaker    6. Sick sinus syndrome    7. Abnormal electrocardiogram (ECG) (EKG)    8. Neurocardiogenic syncope    9. Muscle spasm    10. Nonspecific abnormal electrocardiogram (ECG) (EKG)    11. Orthostatic hypotension    12. Nonspecific abnormal electrocardiogram (ECG)         Plan:   1. Status post mitral valve repair history of mitral regurgitation and mitral valve prolapse  Patient is doing well at the present time no specific complaints.  He does have some mild dyspnea on exertion.  he will need a 2D echocardiogram for valvular function.  2.  Coronary artery disease status post CABG   Patient is doing well at the present time no  specific complaints.  He had a single-vessel coronary artery bypass grafting.    he is currently on Plavix 75 mg p.o. q.day continue the same no bleeding issues of blood in the stool or urine.  3. Sick sinus syndrome status post permanent pacemaker implantation   Patient is doing very well his pacemaker is functioning normally it is atrial paced rhythm. He needs pacemaker evaluation /interrogating soon.  4. Normal neurocardiogenic axis   patient has occasional dizziness and lightheadedness.  He is unsteady on his legs sometimes otherwise she has been coping well and not on any specific medication.    5. Currently is on metoprolol succinate 12.5 mg p.o. q.day seems to be tolerating it well and has been doing very well.  6. EKG  reviewed his EKG independently patient is in atrial paced rhythm with prolonged AV conduction and occasional ventricular paced complexes with rightward axis and T-wave abnormality and heart rate of 70 beats per minute and QTC is 428 milliseconds and corrected QT interval is 462 milliseconds.  7. Patient to continue current management I will see him back in the office in 6 months.              Problem List Items Addressed This Visit          Cardiac/Vascular    Neurocardiogenic syncope    Orthostatic hypotension    CAD in native artery - Primary    Presence of permanent cardiac pacemaker    Sick sinus syndrome    Hx of CABG    Nonspecific abnormal electrocardiogram (ECG)    Abnormal electrocardiogram (ECG) (EKG)    Severe mitral valve regurgitation    Status post mitral valve repair    Nonspecific abnormal electrocardiogram (ECG) (EKG)       Orthopedic    Muscle spasm       No follow-ups on file.

## 2022-10-17 RX ORDER — METOPROLOL SUCCINATE 25 MG/1
12.5 TABLET, EXTENDED RELEASE ORAL DAILY
Qty: 45 TABLET | Refills: 3 | Status: SHIPPED | OUTPATIENT
Start: 2022-10-17 | End: 2023-10-17

## 2022-10-17 NOTE — TELEPHONE ENCOUNTER
----- Message from Ghada Hidalgo sent at 10/17/2022 11:53 AM CDT -----  Contact: Dominga wife  Type:  RX Refill Request    Who Called:  Dominga  Refill or New Rx:  refill  RX Name and Strength:  metoprolol succinate (TOPROL-XL) 25 MG 24 hr tablet  How is the patient currently taking it? (ex. 1XDay):  as directed  Is this a 30 day or 90 day RX:  90  Preferred Pharmacy with phone number:   [facility] [address], [place] [address]  [address] [place] [address]  Phone: [phone] Fax: [phone]  Local or Mail Order:  local  Ordering Provider:  Dr jimi Jimenez Call Back Number:  [phone] (home)   Additional Information:  thanks

## 2022-10-17 NOTE — TELEPHONE ENCOUNTER
----- Message from Ghada Hidalgo sent at 10/17/2022 11:53 AM CDT -----  Contact: Dominga wife  Type:  RX Refill Request    Who Called:  Dominga  Refill or New Rx:  refill  RX Name and Strength:  metoprolol succinate (TOPROL-XL) 25 MG 24 hr tablet  How is the patient currently taking it? (ex. 1XDay):  as directed  Is this a 30 day or 90 day RX:  90  Preferred Pharmacy with phone number:   Replaced by [facility] [address] [facility], [place] - [address]  [address] [place] [address]  Phone: [phone] Fax: [phone]  Local or Mail Order:  local  Ordering Provider:  Dr jimi Jimenez Call Back Number:  [phone] (home)   Additional Information:  thanks

## 2023-08-21 ENCOUNTER — TELEPHONE (OUTPATIENT)
Dept: CARDIOLOGY | Facility: CLINIC | Age: 83
End: 2023-08-21
Payer: MEDICARE

## 2023-08-21 NOTE — TELEPHONE ENCOUNTER
----- Message from Tory Evans sent at 8/21/2023  1:04 PM CDT -----  Type: Need Medical Advice   Who Called:Patient   Best callback number: [phone]  Additional Information: Patient would like to know if Gerhard still see patients in [place] or any part of [place] If so he would like to be seen in [place] Wife ask for a callback to verify  Please call to further assist, Thanks.

## 2023-08-21 NOTE — TELEPHONE ENCOUNTER
Spoke to Mrs. Barrera told her we only go to [facility], they have an appointment on Thursday, told them we may be rescheduling due to him being out on medical leave

## (undated) DEVICE — DRESSING AQUACEL AG W/SILVER 3.5X6

## (undated) DEVICE — ELECTROCAUTERY PHOTONBLADE

## (undated) DEVICE — SUT CTD VICRYL 2-0 UND BR P